# Patient Record
Sex: FEMALE | Race: OTHER | Employment: UNEMPLOYED | URBAN - METROPOLITAN AREA
[De-identification: names, ages, dates, MRNs, and addresses within clinical notes are randomized per-mention and may not be internally consistent; named-entity substitution may affect disease eponyms.]

---

## 2021-02-10 LAB
ABO, EXTERNAL RESULT: NORMAL
HEP B, EXTERNAL RESULT: NEGATIVE
HIV, EXTERNAL RESULT: NEGATIVE
N. GONORRHOEAE, EXTERNAL RESULT: NEGATIVE
RH FACTOR, EXTERNAL RESULT: POSITIVE
RPR, EXTERNAL RESULT: NON REACTIVE
RUBELLA TITER, EXTERNAL RESULT: NORMAL

## 2021-04-28 ENCOUNTER — HOSPITAL ENCOUNTER (OUTPATIENT)
Age: 36
Discharge: HOME HEALTH | End: 2021-04-28
Attending: OBSTETRICS & GYNECOLOGY | Admitting: OBSTETRICS & GYNECOLOGY

## 2021-04-28 ENCOUNTER — HOSPITAL ENCOUNTER (EMERGENCY)
Age: 36
Discharge: HOME | End: 2021-04-28

## 2021-04-28 VITALS
BODY MASS INDEX: 28.68 KG/M2 | SYSTOLIC BLOOD PRESSURE: 110 MMHG | RESPIRATION RATE: 16 BRPM | HEART RATE: 92 BPM | DIASTOLIC BLOOD PRESSURE: 69 MMHG | TEMPERATURE: 98.2 F | HEIGHT: 64 IN | OXYGEN SATURATION: 98 % | WEIGHT: 168 LBS

## 2021-04-28 VITALS — HEIGHT: 64 IN | WEIGHT: 138 LBS | TEMPERATURE: 98.4 F | BODY MASS INDEX: 23.56 KG/M2

## 2021-04-28 DIAGNOSIS — R10.9 ABDOMINAL PAIN DURING PREGNANCY IN SECOND TRIMESTER: Primary | ICD-10-CM

## 2021-04-28 DIAGNOSIS — O26.892 ABDOMINAL PAIN DURING PREGNANCY IN SECOND TRIMESTER: Primary | ICD-10-CM

## 2021-04-28 PROBLEM — Z33.1 PREGNANCY AS INCIDENTAL FINDING: Status: ACTIVE | Noted: 2021-04-28

## 2021-04-28 LAB
ALBUMIN SERPL-MCNC: 3.7 GM/DL (ref 3.4–5)
ALP BLD-CCNC: 62 IU/L (ref 40–129)
ALT SERPL-CCNC: 9 U/L (ref 10–40)
ANION GAP SERPL CALCULATED.3IONS-SCNC: 6 MMOL/L (ref 4–16)
AST SERPL-CCNC: 13 IU/L (ref 15–37)
BACTERIA: NEGATIVE /HPF
BASOPHILS ABSOLUTE: 0 K/CU MM
BASOPHILS RELATIVE PERCENT: 0.4 % (ref 0–1)
BILIRUB SERPL-MCNC: 0.2 MG/DL (ref 0–1)
BILIRUBIN URINE: NEGATIVE MG/DL
BLOOD, URINE: NEGATIVE
BUN BLDV-MCNC: 8 MG/DL (ref 6–23)
CALCIUM SERPL-MCNC: 9.7 MG/DL (ref 8.3–10.6)
CHLORIDE BLD-SCNC: 107 MMOL/L (ref 99–110)
CLARITY: CLEAR
CO2: 23 MMOL/L (ref 21–32)
COLOR: ABNORMAL
CREAT SERPL-MCNC: 0.7 MG/DL (ref 0.6–1.1)
DIFFERENTIAL TYPE: ABNORMAL
EOSINOPHILS ABSOLUTE: 0 K/CU MM
EOSINOPHILS RELATIVE PERCENT: 0.4 % (ref 0–3)
GFR AFRICAN AMERICAN: >60 ML/MIN/1.73M2
GFR NON-AFRICAN AMERICAN: >60 ML/MIN/1.73M2
GLUCOSE BLD-MCNC: 84 MG/DL (ref 70–99)
GLUCOSE, URINE: NEGATIVE MG/DL
GONADOTROPIN, CHORIONIC (HCG) QUANT: NORMAL UIU/ML
HCT VFR BLD CALC: 35.6 % (ref 37–47)
HEMOGLOBIN: 11.6 GM/DL (ref 12.5–16)
IMMATURE NEUTROPHIL %: 0.4 % (ref 0–0.43)
KETONES, URINE: NEGATIVE MG/DL
LEUKOCYTE ESTERASE, URINE: ABNORMAL
LIPASE: 30 IU/L (ref 13–60)
LYMPHOCYTES ABSOLUTE: 1.2 K/CU MM
LYMPHOCYTES RELATIVE PERCENT: 12.3 % (ref 24–44)
MCH RBC QN AUTO: 33 PG (ref 27–31)
MCHC RBC AUTO-ENTMCNC: 32.6 % (ref 32–36)
MCV RBC AUTO: 101.1 FL (ref 78–100)
MONOCYTES ABSOLUTE: 0.6 K/CU MM
MONOCYTES RELATIVE PERCENT: 6.5 % (ref 0–4)
MUCUS: ABNORMAL HPF
NITRITE URINE, QUANTITATIVE: NEGATIVE
NUCLEATED RBC %: 0 %
PDW BLD-RTO: 13 % (ref 11.7–14.9)
PH, URINE: 6 (ref 5–8)
PLATELET # BLD: 256 K/CU MM (ref 140–440)
PMV BLD AUTO: 10.3 FL (ref 7.5–11.1)
POTASSIUM SERPL-SCNC: 4.1 MMOL/L (ref 3.5–5.1)
PROTEIN UA: NEGATIVE MG/DL
RBC # BLD: 3.52 M/CU MM (ref 4.2–5.4)
RBC URINE: <1 /HPF (ref 0–6)
SEGMENTED NEUTROPHILS ABSOLUTE COUNT: 7.8 K/CU MM
SEGMENTED NEUTROPHILS RELATIVE PERCENT: 80 % (ref 36–66)
SODIUM BLD-SCNC: 136 MMOL/L (ref 135–145)
SPECIFIC GRAVITY UA: 1.01 (ref 1–1.03)
SPERM: ABNORMAL /HFP
SQUAMOUS EPITHELIAL: 2 /HPF
TOTAL IMMATURE NEUTOROPHIL: 0.04 K/CU MM
TOTAL NUCLEATED RBC: 0 K/CU MM
TOTAL PROTEIN: 6.8 GM/DL (ref 6.4–8.2)
TRICHOMONAS: ABNORMAL /HPF
UROBILINOGEN, URINE: NEGATIVE MG/DL (ref 0.2–1)
WBC # BLD: 9.7 K/CU MM (ref 4–10.5)
WBC UA: 1 /HPF (ref 0–5)

## 2021-04-28 PROCEDURE — 6370000000 HC RX 637 (ALT 250 FOR IP): Performed by: OBSTETRICS & GYNECOLOGY

## 2021-04-28 PROCEDURE — 80053 COMPREHEN METABOLIC PANEL: CPT

## 2021-04-28 PROCEDURE — 99211 OFF/OP EST MAY X REQ PHY/QHP: CPT

## 2021-04-28 PROCEDURE — 83690 ASSAY OF LIPASE: CPT

## 2021-04-28 PROCEDURE — 84702 CHORIONIC GONADOTROPIN TEST: CPT

## 2021-04-28 PROCEDURE — 99284 EMERGENCY DEPT VISIT MOD MDM: CPT

## 2021-04-28 PROCEDURE — 85025 COMPLETE CBC W/AUTO DIFF WBC: CPT

## 2021-04-28 PROCEDURE — 81001 URINALYSIS AUTO W/SCOPE: CPT

## 2021-04-28 RX ORDER — ACETAMINOPHEN 325 MG/1
650 TABLET ORAL ONCE
Status: COMPLETED | OUTPATIENT
Start: 2021-04-28 | End: 2021-04-28

## 2021-04-28 RX ADMIN — ACETAMINOPHEN 650 MG: 325 TABLET ORAL at 14:25

## 2021-04-28 ASSESSMENT — PAIN DESCRIPTION - FREQUENCY: FREQUENCY: CONTINUOUS

## 2021-04-28 ASSESSMENT — PAIN DESCRIPTION - DESCRIPTORS: DESCRIPTORS: CRAMPING

## 2021-04-28 NOTE — FLOWSHEET NOTE
Presents to L/D up from ER for evaluations of lower abdominal discomfort, shown to LT 02, doppler heart tones 156, TOCO On advised pt would call inter[eter line for OB history.

## 2021-04-28 NOTE — ED PROVIDER NOTES
Patient Identification  Art Patricio is a 29 y.o. female    Chief Complaint  No chief complaint on file. HPI  (History provided by patient through [employer]  service)  This is a 29 y.o. female who was brought in by self for chief complaint of lower abdominal pain. She states she is currently 24 weeks pregnant which I was not aware of until speaking to her through [employer] service. She states this is her second pregnancy, no complications with first pregnancy. Last night began developing cramping intermittent lower abdominal pain that is 8 out of 10. She denies any vaginal bleeding. No dysuria. No fevers. No vomiting. No changes in bowel movements. Has seen OB/GYN at [facility], had ultrasound performed last week which showed that she is having a boy, no concerns. REVIEW OF SYSTEMS    10 systems reviewed and negative except as noted above. See HPI and nursing notes for additional information     I have reviewed the following nursing documentation:  Allergies: Not on File    Past medical history:  has no past medical history on file. Past surgical history:  has no past surgical history on file. Home medications:   Prior to Admission medications    Not on File       Social history:      Family history:  No family history on file. Exam  /69   Pulse 92   Temp 98.2 °F (36.8 °C) (Oral)   Resp 16   Ht 5' 4\" (1.626 m)   Wt 168 lb (76.2 kg)   SpO2 98%   BMI 28.84 kg/m²   Nursing note and vitals reviewed. Constitutional: Well developed, well nourished. No acute distress. HENT:      Head: Normocephalic and atraumatic. Ears: External ears normal.      Nose: Nose normal.     Mouth: Membrane mucosa moist and pink. No posterior oropharynx erythema or tonsillar edema  Eyes: Anicteric sclera. No discharge, PERRL  Neck: Supple. Trachea midline. Cardiovascular: RRR, no murmurs, rubs, or gallops, radial pulses 2+ bilaterally.   Pulmonary/Chest: Effort normal. No respiratory distress. CTAB. No stridor. No wheezes. No rales. Abdominal: Soft. Fundus is approximately 3 cm above the umbilicus, no tenderness to palpation noted, firm. No distension. No guarding, rebound tenderness, or evidence of ascites. : No CVA tenderness. Musculoskeletal: Moves all extremities. No gross deformity. Neurological: Alert and oriented to person, place, and time. Normal muscle tone. Skin: Warm and dry. No rash. Psychiatric: Normal mood and affect.  Behavior is normal.      Radiographs (if obtained):  [] The following radiograph was interpreted by myself in the absence of a radiologist:   [x] Radiologist's Report Reviewed:  No orders to display          Labs  Results for orders placed or performed during the hospital encounter of 04/28/21   CBC Auto Differential   Result Value Ref Range    WBC 9.7 4.0 - 10.5 K/CU MM    RBC 3.52 (L) 4.2 - 5.4 M/CU MM    Hemoglobin 11.6 (L) 12.5 - 16.0 GM/DL    Hematocrit 35.6 (L) 37 - 47 %    .1 (H) 78 - 100 FL    MCH 33.0 (H) 27 - 31 PG    MCHC 32.6 32.0 - 36.0 %    RDW 13.0 11.7 - 14.9 %    Platelets 821 105 - 896 K/CU MM    MPV 10.3 7.5 - 11.1 FL    Differential Type AUTOMATED DIFFERENTIAL     Segs Relative 80.0 (H) 36 - 66 %    Lymphocytes % 12.3 (L) 24 - 44 %    Monocytes % 6.5 (H) 0 - 4 %    Eosinophils % 0.4 0 - 3 %    Basophils % 0.4 0 - 1 %    Segs Absolute 7.8 K/CU MM    Lymphocytes Absolute 1.2 K/CU MM    Monocytes Absolute 0.6 K/CU MM    Eosinophils Absolute 0.0 K/CU MM    Basophils Absolute 0.0 K/CU MM    Nucleated RBC % 0.0 %    Total Nucleated RBC 0.0 K/CU MM    Total Immature Neutrophil 0.04 K/CU MM    Immature Neutrophil % 0.4 0 - 0.43 %   CMP   Result Value Ref Range    Sodium 136 135 - 145 MMOL/L    Potassium 4.1 3.5 - 5.1 MMOL/L    Chloride 107 99 - 110 mMol/L    CO2 23 21 - 32 MMOL/L    BUN 8 6 - 23 MG/DL    CREATININE 0.7 0.6 - 1.1 MG/DL    Glucose 84 70 - 99 MG/DL    Calcium 9.7 8.3 - 10.6 MG/DL    Albumin 3.7 3.4 - 5.0 GM/DL    Total Protein 6.8 6.4 - 8.2 GM/DL    Total Bilirubin 0.2 0.0 - 1.0 MG/DL    ALT 9 (L) 10 - 40 U/L    AST 13 (L) 15 - 37 IU/L    Alkaline Phosphatase 62 40 - 129 IU/L    GFR Non-African American >60 >60 mL/min/1.73m2    GFR African American >60 >60 mL/min/1.73m2    Anion Gap 6 4 - 16   Lipase   Result Value Ref Range    Lipase 30 13 - 60 IU/L   Urinalysis   Result Value Ref Range    Color, UA STRAW (A) YELLOW    Clarity, UA CLEAR CLEAR    Glucose, Urine NEGATIVE NEGATIVE MG/DL    Bilirubin Urine NEGATIVE NEGATIVE MG/DL    Ketones, Urine NEGATIVE NEGATIVE MG/DL    Specific Gravity, UA 1.011 1.001 - 1.035    Blood, Urine NEGATIVE NEGATIVE    pH, Urine 6.0 5.0 - 8.0    Protein, UA NEGATIVE NEGATIVE MG/DL    Urobilinogen, Urine NEGATIVE 0.2 - 1.0 MG/DL    Nitrite Urine, Quantitative NEGATIVE NEGATIVE    Leukocyte Esterase, Urine TRACE (A) NEGATIVE    RBC, UA <1 0 - 6 /HPF    WBC, UA 1 0 - 5 /HPF    Bacteria, UA NEGATIVE NEGATIVE /HPF    Squam Epithel, UA 2 /HPF    Mucus, UA RARE (A) NEGATIVE HPF    Sperm, UA RARE /HFP    Trichomonas, UA NONE SEEN NONE SEEN /HPF   HCG Serum, Quantitative   Result Value Ref Range    hCG Quant 89576.0 UIU/ML         MDM  Patient presents for abdominal pain. I was unaware she was currently pregnant until speaking with her through [employer]  service after she had been in department for several hours. Spoke with triage staff who stated that she denied being pregnant during triage. She has a palpable fundus. Her laboratory testing shows significantly elevated hCG quant. It sounds like she had anatomy scan performed last week which would fit with her gestational age. Other labs show mild anemia. Given that she over 20 weeks she is being discharged to go immediately to labor and delivery, will be transported by ED staff. She is comfortable with this plan. Plan is to discharge. Return to ED for any new or worsening symptoms.     I have independently evaluated this patient. Final Impression  1. Abdominal pain during pregnancy in second trimester        Blood pressure 110/69, pulse 92, temperature 98.2 °F (36.8 °C), temperature source Oral, resp. rate 16, height 5' 4\" (1.626 m), weight 168 lb (76.2 kg), SpO2 98 %. Disposition:  Discharge to L&D in stable condition. Patient was given scripts for the following medications. I counseled patient how to take these medications. New Prescriptions    No medications on file       This chart was generated using the [address] [place] [address] dictation system. I created this record but it may contain dictation errors given the limitations of this technology.        Yovany Santiago PA-C  04/28/21 0083

## 2021-04-28 NOTE — FLOWSHEET NOTE
Tele Language Line [id number] Ob history taken POC discussed . Questions answered,  Pt states baby has been active and moving, denies any vaginal leaking or bleeding denies any tender spots to touch on abdomen.

## 2021-06-10 LAB — GBS, EXTERNAL RESULT: NEGATIVE

## 2021-08-24 ENCOUNTER — ANESTHESIA (OUTPATIENT)
Dept: LABOR AND DELIVERY | Age: 36
DRG: 560 | End: 2021-08-24
Payer: MEDICAID

## 2021-08-24 ENCOUNTER — HOSPITAL ENCOUNTER (INPATIENT)
Age: 36
LOS: 2 days | Discharge: HOME | DRG: 560 | End: 2021-08-26
Attending: OBSTETRICS & GYNECOLOGY | Admitting: OBSTETRICS & GYNECOLOGY
Payer: MEDICAID

## 2021-08-24 ENCOUNTER — ANESTHESIA EVENT (OUTPATIENT)
Dept: LABOR AND DELIVERY | Age: 36
DRG: 560 | End: 2021-08-24
Payer: MEDICAID

## 2021-08-24 DIAGNOSIS — G89.18 POST-OP PAIN: Primary | ICD-10-CM

## 2021-08-24 LAB
ABO/RH: NORMAL
ALBUMIN SERPL-MCNC: 3.5 GM/DL (ref 3.4–5)
ALP BLD-CCNC: 198 IU/L (ref 40–128)
ALT SERPL-CCNC: 7 U/L (ref 10–40)
AMPHETAMINES: NEGATIVE
ANION GAP SERPL CALCULATED.3IONS-SCNC: 15 MMOL/L (ref 4–16)
ANTIBODY SCREEN: NEGATIVE
AST SERPL-CCNC: 14 IU/L (ref 15–37)
BACTERIA: NEGATIVE /HPF
BARBITURATE SCREEN URINE: NEGATIVE
BENZODIAZEPINE SCREEN, URINE: NEGATIVE
BILIRUB SERPL-MCNC: 0.2 MG/DL (ref 0–1)
BILIRUBIN URINE: NEGATIVE MG/DL
BLOOD, URINE: ABNORMAL
BUN BLDV-MCNC: 12 MG/DL (ref 6–23)
CALCIUM SERPL-MCNC: 9.2 MG/DL (ref 8.3–10.6)
CANNABINOID SCREEN URINE: NEGATIVE
CHLORIDE BLD-SCNC: 109 MMOL/L (ref 99–110)
CLARITY: ABNORMAL
CO2: 18 MMOL/L (ref 21–32)
COCAINE METABOLITE: NEGATIVE
COLOR: YELLOW
CREAT SERPL-MCNC: 0.7 MG/DL (ref 0.6–1.1)
GFR AFRICAN AMERICAN: >60 ML/MIN/1.73M2
GFR NON-AFRICAN AMERICAN: >60 ML/MIN/1.73M2
GLUCOSE BLD-MCNC: 125 MG/DL (ref 70–99)
GLUCOSE, URINE: NEGATIVE MG/DL
HCT VFR BLD CALC: 31.9 % (ref 37–47)
HEMOGLOBIN: 10.1 GM/DL (ref 12.5–16)
KETONES, URINE: NEGATIVE MG/DL
LEUKOCYTE ESTERASE, URINE: NEGATIVE
MCH RBC QN AUTO: 30.9 PG (ref 27–31)
MCHC RBC AUTO-ENTMCNC: 31.7 % (ref 32–36)
MCV RBC AUTO: 97.6 FL (ref 78–100)
NITRITE URINE, QUANTITATIVE: NEGATIVE
OPIATES, URINE: NEGATIVE
OXYCODONE: NEGATIVE
PDW BLD-RTO: 14.1 % (ref 11.7–14.9)
PH, URINE: 5 (ref 5–8)
PHENCYCLIDINE, URINE: NEGATIVE
PLATELET # BLD: 178 K/CU MM (ref 140–440)
PMV BLD AUTO: 12.3 FL (ref 7.5–11.1)
POTASSIUM SERPL-SCNC: 4.4 MMOL/L (ref 3.5–5.1)
PROTEIN UA: NEGATIVE MG/DL
RBC # BLD: 3.27 M/CU MM (ref 4.2–5.4)
RBC URINE: 308 /HPF (ref 0–6)
SARS-COV-2, NAAT: NOT DETECTED
SODIUM BLD-SCNC: 142 MMOL/L (ref 135–145)
SOURCE: NORMAL
SPECIFIC GRAVITY UA: 1.01 (ref 1–1.03)
SQUAMOUS EPITHELIAL: 1 /HPF
TOTAL PROTEIN: 5.8 GM/DL (ref 6.4–8.2)
TRANSITIONAL EPITHELIAL: <1 /HPF
TRICHOMONAS: ABNORMAL /HPF
UROBILINOGEN, URINE: NEGATIVE MG/DL (ref 0.2–1)
WBC # BLD: 6 K/CU MM (ref 4–10.5)
WBC UA: 5 /HPF (ref 0–5)

## 2021-08-24 PROCEDURE — 87635 SARS-COV-2 COVID-19 AMP PRB: CPT

## 2021-08-24 PROCEDURE — 85027 COMPLETE CBC AUTOMATED: CPT

## 2021-08-24 PROCEDURE — 51702 INSERT TEMP BLADDER CATH: CPT

## 2021-08-24 PROCEDURE — 6360000002 HC RX W HCPCS: Performed by: NURSE ANESTHETIST, CERTIFIED REGISTERED

## 2021-08-24 PROCEDURE — 7200000001 HC VAGINAL DELIVERY

## 2021-08-24 PROCEDURE — 86901 BLOOD TYPING SEROLOGIC RH(D): CPT

## 2021-08-24 PROCEDURE — 80307 DRUG TEST PRSMV CHEM ANLYZR: CPT

## 2021-08-24 PROCEDURE — 6360000002 HC RX W HCPCS: Performed by: OBSTETRICS & GYNECOLOGY

## 2021-08-24 PROCEDURE — 0HQ9XZZ REPAIR PERINEUM SKIN, EXTERNAL APPROACH: ICD-10-PCS | Performed by: OBSTETRICS & GYNECOLOGY

## 2021-08-24 PROCEDURE — 3700000025 EPIDURAL BLOCK: Performed by: NURSE ANESTHETIST, CERTIFIED REGISTERED

## 2021-08-24 PROCEDURE — 81001 URINALYSIS AUTO W/SCOPE: CPT

## 2021-08-24 PROCEDURE — 6370000000 HC RX 637 (ALT 250 FOR IP): Performed by: OBSTETRICS & GYNECOLOGY

## 2021-08-24 PROCEDURE — 86850 RBC ANTIBODY SCREEN: CPT

## 2021-08-24 PROCEDURE — 2580000003 HC RX 258: Performed by: OBSTETRICS & GYNECOLOGY

## 2021-08-24 PROCEDURE — 86900 BLOOD TYPING SEROLOGIC ABO: CPT

## 2021-08-24 PROCEDURE — 1220000000 HC SEMI PRIVATE OB R&B

## 2021-08-24 PROCEDURE — 80053 COMPREHEN METABOLIC PANEL: CPT

## 2021-08-24 RX ORDER — SODIUM CHLORIDE, SODIUM LACTATE, POTASSIUM CHLORIDE, CALCIUM CHLORIDE 600; 310; 30; 20 MG/100ML; MG/100ML; MG/100ML; MG/100ML
INJECTION, SOLUTION INTRAVENOUS CONTINUOUS
Status: DISCONTINUED | OUTPATIENT
Start: 2021-08-24 | End: 2021-08-26 | Stop reason: HOSPADM

## 2021-08-24 RX ORDER — LANOLIN 100 %
OINTMENT (GRAM) TOPICAL PRN
Status: DISCONTINUED | OUTPATIENT
Start: 2021-08-24 | End: 2021-08-26 | Stop reason: HOSPADM

## 2021-08-24 RX ORDER — SODIUM CHLORIDE 0.9 % (FLUSH) 0.9 %
5-40 SYRINGE (ML) INJECTION PRN
Status: DISCONTINUED | OUTPATIENT
Start: 2021-08-24 | End: 2021-08-26 | Stop reason: HOSPADM

## 2021-08-24 RX ORDER — ROPIVACAINE HYDROCHLORIDE 2 MG/ML
INJECTION, SOLUTION EPIDURAL; INFILTRATION; PERINEURAL PRN
Status: DISCONTINUED | OUTPATIENT
Start: 2021-08-24 | End: 2021-08-24 | Stop reason: SDUPTHER

## 2021-08-24 RX ORDER — IBUPROFEN 800 MG/1
800 TABLET ORAL EVERY 8 HOURS
Status: DISCONTINUED | OUTPATIENT
Start: 2021-08-25 | End: 2021-08-26 | Stop reason: HOSPADM

## 2021-08-24 RX ORDER — FENTANYL CITRATE 50 UG/ML
100 INJECTION, SOLUTION INTRAMUSCULAR; INTRAVENOUS
Status: DISCONTINUED | OUTPATIENT
Start: 2021-08-24 | End: 2021-08-24 | Stop reason: HOSPADM

## 2021-08-24 RX ORDER — HYDROCODONE BITARTRATE AND ACETAMINOPHEN 5; 325 MG/1; MG/1
1 TABLET ORAL EVERY 4 HOURS PRN
Status: DISCONTINUED | OUTPATIENT
Start: 2021-08-24 | End: 2021-08-26 | Stop reason: HOSPADM

## 2021-08-24 RX ORDER — NALOXONE HYDROCHLORIDE 0.4 MG/ML
0.4 INJECTION, SOLUTION INTRAMUSCULAR; INTRAVENOUS; SUBCUTANEOUS PRN
Status: DISCONTINUED | OUTPATIENT
Start: 2021-08-24 | End: 2021-08-26 | Stop reason: HOSPADM

## 2021-08-24 RX ORDER — ROPIVACAINE HYDROCHLORIDE 2 MG/ML
INJECTION, SOLUTION EPIDURAL; INFILTRATION; PERINEURAL CONTINUOUS PRN
Status: DISCONTINUED | OUTPATIENT
Start: 2021-08-24 | End: 2021-08-24 | Stop reason: SDUPTHER

## 2021-08-24 RX ORDER — LIDOCAINE HYDROCHLORIDE 10 MG/ML
30 INJECTION, SOLUTION EPIDURAL; INFILTRATION; INTRACAUDAL; PERINEURAL PRN
Status: DISCONTINUED | OUTPATIENT
Start: 2021-08-24 | End: 2021-08-26 | Stop reason: HOSPADM

## 2021-08-24 RX ORDER — ROPIVACAINE HYDROCHLORIDE 2 MG/ML
12 INJECTION, SOLUTION EPIDURAL; INFILTRATION; PERINEURAL CONTINUOUS
Status: DISCONTINUED | OUTPATIENT
Start: 2021-08-24 | End: 2021-08-26 | Stop reason: HOSPADM

## 2021-08-24 RX ORDER — DOCUSATE SODIUM 100 MG/1
100 CAPSULE, LIQUID FILLED ORAL 2 TIMES DAILY
Status: DISCONTINUED | OUTPATIENT
Start: 2021-08-24 | End: 2021-08-26 | Stop reason: HOSPADM

## 2021-08-24 RX ORDER — HYDROCODONE BITARTRATE AND ACETAMINOPHEN 5; 325 MG/1; MG/1
2 TABLET ORAL EVERY 4 HOURS PRN
Status: DISCONTINUED | OUTPATIENT
Start: 2021-08-24 | End: 2021-08-26 | Stop reason: HOSPADM

## 2021-08-24 RX ORDER — ACETAMINOPHEN 325 MG/1
650 TABLET ORAL EVERY 4 HOURS PRN
Status: DISCONTINUED | OUTPATIENT
Start: 2021-08-24 | End: 2021-08-26 | Stop reason: HOSPADM

## 2021-08-24 RX ORDER — SODIUM CHLORIDE 9 MG/ML
25 INJECTION, SOLUTION INTRAVENOUS PRN
Status: DISCONTINUED | OUTPATIENT
Start: 2021-08-24 | End: 2021-08-26 | Stop reason: HOSPADM

## 2021-08-24 RX ORDER — ONDANSETRON 2 MG/ML
4 INJECTION INTRAMUSCULAR; INTRAVENOUS EVERY 6 HOURS PRN
Status: DISCONTINUED | OUTPATIENT
Start: 2021-08-24 | End: 2021-08-24 | Stop reason: HOSPADM

## 2021-08-24 RX ORDER — ONDANSETRON 2 MG/ML
4 INJECTION INTRAMUSCULAR; INTRAVENOUS EVERY 6 HOURS PRN
Status: DISCONTINUED | OUTPATIENT
Start: 2021-08-24 | End: 2021-08-26 | Stop reason: HOSPADM

## 2021-08-24 RX ORDER — SODIUM CHLORIDE 0.9 % (FLUSH) 0.9 %
5-40 SYRINGE (ML) INJECTION EVERY 12 HOURS SCHEDULED
Status: DISCONTINUED | OUTPATIENT
Start: 2021-08-24 | End: 2021-08-26 | Stop reason: HOSPADM

## 2021-08-24 RX ADMIN — HYDROCODONE BITARTRATE AND ACETAMINOPHEN 1 TABLET: 5; 325 TABLET ORAL at 17:49

## 2021-08-24 RX ADMIN — DOCUSATE SODIUM 100 MG: 100 CAPSULE ORAL at 20:37

## 2021-08-24 RX ADMIN — SODIUM CHLORIDE, POTASSIUM CHLORIDE, SODIUM LACTATE AND CALCIUM CHLORIDE: 600; 310; 30; 20 INJECTION, SOLUTION INTRAVENOUS at 01:29

## 2021-08-24 RX ADMIN — ROPIVACAINE HYDROCHLORIDE 10 ML: 2 INJECTION, SOLUTION EPIDURAL; INFILTRATION at 04:01

## 2021-08-24 RX ADMIN — SODIUM CHLORIDE, POTASSIUM CHLORIDE, SODIUM LACTATE AND CALCIUM CHLORIDE: 600; 310; 30; 20 INJECTION, SOLUTION INTRAVENOUS at 04:22

## 2021-08-24 RX ADMIN — Medication 166.7 ML: at 05:03

## 2021-08-24 RX ADMIN — Medication 1 MILLI-UNITS/MIN: at 02:02

## 2021-08-24 RX ADMIN — HYDROCODONE BITARTRATE AND ACETAMINOPHEN 1 TABLET: 5; 325 TABLET ORAL at 10:32

## 2021-08-24 RX ADMIN — ROPIVACAINE HYDROCHLORIDE 12 ML/HR: 2 INJECTION, SOLUTION EPIDURAL; INFILTRATION; PERINEURAL at 04:04

## 2021-08-24 RX ADMIN — DOCUSATE SODIUM 100 MG: 100 CAPSULE ORAL at 10:31

## 2021-08-24 RX ADMIN — DOCUSATE SODIUM 100 MG: 100 CAPSULE ORAL at 10:32

## 2021-08-24 RX ADMIN — SODIUM CHLORIDE, POTASSIUM CHLORIDE, SODIUM LACTATE AND CALCIUM CHLORIDE: 600; 310; 30; 20 INJECTION, SOLUTION INTRAVENOUS at 05:03

## 2021-08-24 RX ADMIN — ACETAMINOPHEN 650 MG: 325 TABLET ORAL at 20:37

## 2021-08-24 RX ADMIN — SODIUM CHLORIDE, POTASSIUM CHLORIDE, SODIUM LACTATE AND CALCIUM CHLORIDE: 600; 310; 30; 20 INJECTION, SOLUTION INTRAVENOUS at 02:04

## 2021-08-24 ASSESSMENT — PAIN DESCRIPTION - ORIENTATION
ORIENTATION: LOWER
ORIENTATION: MID

## 2021-08-24 ASSESSMENT — PAIN DESCRIPTION - DESCRIPTORS
DESCRIPTORS: CRAMPING
DESCRIPTORS: DISCOMFORT;CRAMPING
DESCRIPTORS: CRAMPING

## 2021-08-24 ASSESSMENT — PAIN SCALES - GENERAL
PAINLEVEL_OUTOF10: 0
PAINLEVEL_OUTOF10: 4
PAINLEVEL_OUTOF10: 4
PAINLEVEL_OUTOF10: 0
PAINLEVEL_OUTOF10: 3
PAINLEVEL_OUTOF10: 3
PAINLEVEL_OUTOF10: 0
PAINLEVEL_OUTOF10: 7
PAINLEVEL_OUTOF10: 0
PAINLEVEL_OUTOF10: 4

## 2021-08-24 ASSESSMENT — PAIN - FUNCTIONAL ASSESSMENT
PAIN_FUNCTIONAL_ASSESSMENT: ACTIVITIES ARE NOT PREVENTED

## 2021-08-24 ASSESSMENT — PAIN DESCRIPTION - PAIN TYPE
TYPE: ACUTE PAIN

## 2021-08-24 ASSESSMENT — PAIN DESCRIPTION - PROGRESSION
CLINICAL_PROGRESSION: RESOLVED
CLINICAL_PROGRESSION: RESOLVED
CLINICAL_PROGRESSION: GRADUALLY WORSENING
CLINICAL_PROGRESSION: RESOLVED
CLINICAL_PROGRESSION: GRADUALLY WORSENING
CLINICAL_PROGRESSION: GRADUALLY WORSENING
CLINICAL_PROGRESSION: GRADUALLY IMPROVING
CLINICAL_PROGRESSION: GRADUALLY WORSENING
CLINICAL_PROGRESSION: GRADUALLY IMPROVING
CLINICAL_PROGRESSION: GRADUALLY WORSENING
CLINICAL_PROGRESSION: GRADUALLY WORSENING

## 2021-08-24 ASSESSMENT — PAIN DESCRIPTION - ONSET
ONSET: ON-GOING
ONSET: GRADUAL

## 2021-08-24 ASSESSMENT — PAIN DESCRIPTION - FREQUENCY
FREQUENCY: INTERMITTENT

## 2021-08-24 ASSESSMENT — PAIN DESCRIPTION - LOCATION
LOCATION: ABDOMEN

## 2021-08-24 NOTE — FLOWSHEET NOTE
Patient presents to [facility] by alin with c/o LOF. Oriented to LT-02 and call [facility] system. Assisted patient to change into gown. Language Line dialed and awaiting  for [place].

## 2021-08-24 NOTE — FLOWSHEET NOTE
O2 applied by non-re-breather mask, and pitocin infusion stopped at this time. Pt positioned fully on left side with pillow support.

## 2021-08-24 NOTE — FLOWSHEET NOTE
TR to Dr. Darrel Mckeon regarding patient arriving by squad with PROM and large amount of clear fluid. Reported to doc GA, OB history, patient gets routine prenatal care @ [facility], no prenatal history on unit but nurses are calling labs to obtain prenatal lab work currently, FMS, and SVE results. Orders received. RN voiced understanding.

## 2021-08-24 NOTE — FLOWSHEET NOTE
Epidural Note    [id number]     Chuckie CRAWFORD, IN ROOM. Consent obtained  [address]     TEST DOSE   0402     BOLUS DOSE  0408     ON IV PUMP      Pt resting comfortabley with pillow tilt left side. VS stable. Pt denies any needs at this time.       #[id number] used for interpretation of procedure

## 2021-08-24 NOTE — FLOWSHEET NOTE
External monitors unplugged. Patient transferred to -01 via triage bed by nurse and scrub tech. Oriented to room and call light system. Patient assisted to labor bed.  #[id number] remained online from [phone] throughout admission. Hep B vaccine education read then interpreted. Report given to RISA Larose RN.  remains online.

## 2021-08-24 NOTE — PLAN OF CARE
parameters  2021 08 by Jay Ross RN  Outcome: Completed  2021 by Jay Ross RN  Outcome: Ongoing     Problem:  Screening:  Goal: Ability to make informed decisions regarding treatment has improved  Description: Ability to make informed decisions regarding treatment has improved  2021 by Jay Ross RN  Outcome: Completed  2021 by Jay Ross RN  Outcome: Ongoing     Problem: Pain - Acute:  Goal: Pain level will decrease  Description: Pain level will decrease  2021 7893 by Jay Ross RN  Outcome: Completed  2021 by Jay Ross RN  Outcome: Ongoing  Goal: Able to cope with pain  Description: Able to cope with pain  2021 by Jay Ross RN  Outcome: Completed  2021 by Jay Ross RN  Outcome: Ongoing     Problem: Tissue Perfusion - Uteroplacental, Altered:  Description: [TRUNCATED] For intrapartum patients with recurrent variable decelerations of the fetal heart rate, consider transcervical amnioinfusion. For patients in labor, avoid prophylactic use of continuous maternal oxygen supplementation to prevent nonreassu . ..   Goal: Absence of abnormal fetal heart rate pattern  Description: Absence of abnormal fetal heart rate pattern  2021 6933 by Jay Ross RN  Outcome: Completed  2021 by Jay Rsos RN  Outcome: Ongoing     Problem: Urinary Retention:  Goal: Experiences of bladder distention will decrease  Description: Experiences of bladder distention will decrease  2021 by Jay Ross RN  Outcome: Completed  2021 by Jay Ross RN  Outcome: Ongoing  Goal: Urinary elimination within specified parameters  Description: Urinary elimination within specified parameters  2021 by Jay Ross RN  Outcome: Completed  2021 by Jay Ross RN  Outcome: Ongoing     Problem: Pain:  Description: Pain management should include both nonpharmacologic and pharmacologic interventions. Goal: Pain level will decrease  Description: Pain level will decrease  8/24/2021 8986 by Jaja Philip RN  Outcome: Completed  8/24/2021 0139 by Jaja Philip RN  Outcome: Ongoing  Goal: Control of acute pain  Description: Control of acute pain  Outcome: Completed  Goal: Control of chronic pain  Description: Control of chronic pain  Outcome: Completed     Problem: VAGINAL DELIVERY - RECOVERY AND POST PARTUM  Goal: Vital signs are medically acceptable  Outcome: Ongoing  Goal: Patient will remain free of falls  Outcome: Ongoing  Goal: Fundus firm at midline  Outcome: Ongoing  Goal: Moderate rubra without clots, no purulent discharge, no foul smelling lochia  Outcome: Ongoing  Goal: Empties bladder  Outcome: Ongoing  Goal: Verbalizes understanding of normal bowel function resumption  Outcome: Ongoing  Goal: Edema will be absent or minimal  Outcome: Ongoing  Goal: Breasts are soft with nipple integrity intact  Outcome: Ongoing  Goal: Demonstrates appropriate breast feeding techniques  Outcome: Ongoing  Goal: Appropriate behavior observed  Outcome: Ongoing  Goal: Positive Mother-Baby interactions are observed  Outcome: Ongoing  Goal: Perineum intact without discharge or hematoma  Outcome: Ongoing  Goal: Ambulates independently  Outcome: Ongoing     Problem: PAIN  Goal: Patient's pain/discomfort is manageable  Outcome: Ongoing     Problem: KNOWLEDGE DEFICIT  Goal: Patient/S.O. demonstrates understanding of disease process, treatment plan, medications, and discharge instructions.   Outcome: Ongoing

## 2021-08-24 NOTE — FLOWSHEET NOTE
Bud, CRNA notified via telephone of pts VS, tachycardia, O2 sat, and fluctuating BP. Notified of nursing interventions. No orders at this time.

## 2021-08-24 NOTE — H&P
Department of Obstetrics and Gynecology   Obstetrics History and Physical        CHIEF COMPLAINT:   Chief Complaint   Patient presents with    Rupture of Membranes     SROM         HISTORY OF PRESENT ILLNESS:      The patient is a 29 y.o. female at 37w11d. OB History        2    Para   1    Term   1            AB        Living   1       SAB        TAB        Ectopic        Molar        Multiple        Live Births   1            Patient presents with a chief complaint as above and is being admitted for active phase labor    Estimated Due Date: Estimated Date of Delivery: 21    PRENATAL CARE:    Complicated by: none    PAST OB HISTORY  OB History        2    Para   1    Term   1            AB        Living   1       SAB        TAB        Ectopic        Molar        Multiple        Live Births   1                Past Medical History:    History reviewed. No pertinent past medical history. Past Surgical History:    History reviewed. No pertinent surgical history. Allergies:  Patient has no known allergies. Social History:    Social History     Socioeconomic History    Marital status:      Spouse name: Not on file    Number of children: Not on file    Years of education: Not on file    Highest education level: Not on file   Occupational History    Not on file   Tobacco Use    Smoking status: Never Smoker    Smokeless tobacco: Never Used   Substance and Sexual Activity    Alcohol use: Not Currently    Drug use: Never    Sexual activity: Never   Other Topics Concern    Not on file   Social History Narrative    Not on file     Social Determinants of Health     Financial Resource Strain:     Difficulty of Paying Living Expenses:    Food Insecurity:     Worried About Running Out of Food in the Last Year:     [address] in the Last Year:    Transportation Needs:     Lack of Transportation (Medical):      Lack of Transportation (Non-Medical):    Physical Activity:     Days of Exercise per Week:     Minutes of Exercise per Session:    Stress:     Feeling of Stress :    Social Connections:     Frequency of Communication with Friends and Family:     Frequency of Social Gatherings with Friends and Family:     Attends [employer] Services:     Active Member of Clubs or Organizations:     Attends Club or Organization Meetings:     Marital Status:    Intimate Partner Violence:     Fear of Current or Ex-Partner:     Emotionally Abused:     Physically Abused:     Sexually Abused:      Family History:   History reviewed. No pertinent family history. Medications Prior to Admission:  Medications Prior to Admission: Prenatal Vit-Fe Fumarate-FA (PRENATAL 1+1 PO), Take by mouth    REVIEW OF SYSTEMS:    CONSTITUTIONAL:  negative  RESPIRATORY:  negative  CARDIOVASCULAR:  negative  GASTROINTESTINAL:  negative  ALLERGIC/IMMUNOLOGIC:  negative  NEUROLOGICAL:  negative  BEHAVIOR/PSYCH:  negative    PHYSICAL EXAM:  Blood pressure 120/67, pulse 72, temperature 97.7 °F (36.5 °C), resp. rate 15, height 5' 8\" (1.727 m), weight 172 lb (78 kg). General appearance:  awake, alert, cooperative, no apparent distress, and appears stated age  Neurologic:  Awake, alert, oriented to name, place and time.     Lungs:  No increased work of breathing, good air exchange  Abdomen:  Soft, non tender, gravid, consistent with her gestational age,   Fetal heart rate:    Baseline Heart Rate:  120s        Accelerations:  present       Long Term Variability:  moderate       Decelerations:  variable       Pelvis:  Adequate pelvis  Cervix: 4 cm 80 soft -2      Contraction frequency:  0 minutes    Membranes:  Ruptured clear fluid    ASSESSMENT AND PLAN:    Labor: Admit, anticipate normal delivery, routine labor orders  Fetus: Reassuring  GBS:negative  Other: IV hydration and antiemetics, pitocin

## 2021-08-24 NOTE — ANESTHESIA PRE PROCEDURE
Department of Anesthesiology  Preprocedure Note       Name:  Susan Ruano   Age:  29 y.o.  :  1985                                          MRN:  [id number]         Date:  2021      Surgeon: * No surgeons listed *    Procedure: * No procedures listed *    Medications prior to admission:   Prior to Admission medications    Medication Sig Start Date End Date Taking? Authorizing Provider   Prenatal Vit-Fe Fumarate-FA (PRENATAL 1+1 PO) Take by mouth   Yes Historical Provider, MD       Current medications:    Current Facility-Administered Medications   Medication Dose Route Frequency Provider Last Rate Last Admin    lactated ringers infusion   Intravenous Continuous Lauren Valentino  mL/hr at 21 0325 Rate Change at 21 0325    oxytocin (PITOCIN) 30 units in 500 mL infusion  87.3 opal-units/min Intravenous Continuous PRN Lauren Valentino MD        And    oxytocin (PITOCIN) 10 unit bolus from the bag  10 Units Intravenous PRN Lauren Valentino MD        lidocaine PF 1 % injection 30 mL  30 mL Other PRN Lauren Valentino MD        fentaNYL (SUBLIMAZE) injection 100 mcg  100 mcg Intravenous Q1H PRN Lauren Valentino MD        famotidine (PEPCID) injection 20 mg  20 mg Intravenous BID PRN Lauren Valentino MD        ondansetron [facility]) injection 4 mg  4 mg Intravenous Q6H PRN Lauren Valentino MD        oxytocin (PITOCIN) 30 units in 500 mL infusion  1-20 opal-units/min Intravenous Continuous Lauren Valentino MD 4 mL/hr at 21 0315 4 opal-units/min at 21 0315       Allergies:  No Known Allergies    Problem List:    Patient Active Problem List   Diagnosis Code    Pregnancy as incidental finding Z33.1       Past Medical History:  History reviewed. No pertinent past medical history. Past Surgical History:  History reviewed. No pertinent surgical history.     Social History:    Social History     Tobacco Use    Smoking status: Never Smoker    Smokeless tobacco: Never Used   Substance Use Topics    Alcohol use: Not Currently                                Counseling given: Not Answered      Vital Signs (Current):   Vitals:    08/24/21 0058 08/24/21 0112 08/24/21 0130 08/24/21 0240   BP: 116/75 122/75  120/67   Pulse: 80 80  72   Resp: 18   15   Temp: 36.7 °C (98.1 °F)   36.5 °C (97.7 °F)   Weight:   172 lb (78 kg)    Height:   5' 8\" (1.727 m)                                               BP Readings from Last 3 Encounters:   08/24/21 120/67   04/28/21 110/69       NPO Status:                                                                                 BMI:   Wt Readings from Last 3 Encounters:   08/24/21 172 lb (78 kg)   04/28/21 138 lb (62.6 kg)   04/28/21 168 lb (76.2 kg)     Body mass index is 26.15 kg/m². CBC:   Lab Results   Component Value Date    WBC 6.0 08/24/2021    RBC 3.27 08/24/2021    HGB 10.1 08/24/2021    HCT 31.9 08/24/2021    MCV 97.6 08/24/2021    RDW 14.1 08/24/2021     08/24/2021       CMP:   Lab Results   Component Value Date     04/28/2021    K 4.1 04/28/2021     04/28/2021    CO2 23 04/28/2021    BUN 8 04/28/2021    CREATININE 0.7 04/28/2021    GFRAA >60 04/28/2021    LABGLOM >60 04/28/2021    GLUCOSE 84 04/28/2021    PROT 6.8 04/28/2021    CALCIUM 9.7 04/28/2021    BILITOT 0.2 04/28/2021    ALKPHOS 62 04/28/2021    AST 13 04/28/2021    ALT 9 04/28/2021       POC Tests: No results for input(s): POCGLU, POCNA, POCK, POCCL, POCBUN, POCHEMO, POCHCT in the last 72 hours.     Coags: No results found for: PROTIME, INR, APTT    HCG (If Applicable): No results found for: PREGTESTUR, PREGSERUM, HCG, HCGQUANT     ABGs: No results found for: PHART, PO2ART, GPO2GMA, DNK2FOX, BEART, [id number]     Type & Screen (If Applicable):  No results found for: LABABO, LABRH    Drug/Infectious Status (If Applicable):  No results found for: HIV, HEPCAB    COVID-19 Screening (If Applicable):   Lab Results   Component Value Date    COVID19 NOT DETECTED 08/24/2021           Anesthesia Evaluation  Patient summary reviewed and Nursing notes reviewed no history of anesthetic complications:   Airway: Mallampati: II  TM distance: >3 FB   Neck ROM: full  Mouth opening: > = 3 FB Dental: normal exam         Pulmonary:Negative Pulmonary ROS and normal exam                               Cardiovascular:Negative CV ROS                      Neuro/Psych:   Negative Neuro/Psych ROS              GI/Hepatic/Renal: Neg GI/Hepatic/Renal ROS            Endo/Other: Negative Endo/Other ROS                    Abdominal:             Vascular: negative vascular ROS. Other Findings:             Anesthesia Plan      epidural     ASA 2             Anesthetic plan and risks discussed with patient.                       AVTAR Magana - CRNA   8/24/2021

## 2021-08-24 NOTE — FLOWSHEET NOTE
#[id number] used. RN aids pt to bathroom. Pt educated on safe ambulation, postpartum bleeding, and vahid care. Pt demonstrates and verbalized understanding. RN provided fresh gown, gripper socks, vahid pad, and underwear to pt. Pt voices no further needs. RN discontinued urinary catheter. Pt aided into wheelchair to be transferred to [place] [address].

## 2021-08-24 NOTE — PLAN OF CARE
Problem: VAGINAL DELIVERY - RECOVERY AND POST PARTUM  Goal: Vital signs are medically acceptable  8/24/2021 1828 by Mis Bullock RN  Outcome: Met This Shift  8/24/2021 1827 by Mis Bullock RN  Outcome: Met This Shift  8/24/2021 0822 by Tigre Cohen RN  Outcome: Ongoing  Goal: Patient will remain free of falls  8/24/2021 1828 by Mis Bullock RN  Outcome: Met This Shift  8/24/2021 1827 by Mis Bullock RN  Outcome: Met This Shift  8/24/2021 0822 by Tigre Cohen RN  Outcome: Ongoing  Goal: Fundus firm at midline  8/24/2021 1828 by Mis Bullock RN  Outcome: Met This Shift  8/24/2021 1827 by Mis Bullock RN  Outcome: Met This Shift  8/24/2021 0822 by Tigre Cohen RN  Outcome: Ongoing  Goal: Moderate rubra without clots, no purulent discharge, no foul smelling lochia  8/24/2021 1828 by Mis Bullock RN  Outcome: Met This Shift  8/24/2021 1827 by Mis Bullock RN  Outcome: Met This Shift  8/24/2021 0822 by Tigre Cohen RN  Outcome: Ongoing  Goal: Empties bladder  8/24/2021 1828 by Mis Bullock RN  Outcome: Met This Shift  8/24/2021 1827 by Mis Bullock RN  Outcome: Met This Shift  8/24/2021 0822 by Tigre Cohen RN  Outcome: Ongoing  Goal: Verbalizes understanding of normal bowel function resumption  8/24/2021 1828 by Mis Bullock RN  Outcome: Met This Shift  8/24/2021 1827 by Mis Bullock RN  Outcome: Met This Shift  8/24/2021 0822 by Tigre Cohen RN  Outcome: Ongoing  Goal: Edema will be absent or minimal  8/24/2021 1828 by Mis Bullock RN  Outcome: Met This Shift  8/24/2021 1827 by Mis Bullock RN  Outcome: Met This Shift  8/24/2021 0822 by Tigre Cohen RN  Outcome: Ongoing  Goal: Breasts are soft with nipple integrity intact  8/24/2021 1828 by Mis Bullock RN  Outcome: Met This Shift  8/24/2021 1827 by Msi Bullock RN  Outcome: Met This Shift  8/24/2021 1722 by Tigre Cohen RN  Outcome: Ongoing  Goal: Demonstrates appropriate breast feeding techniques  8/24/2021 1828 by Mickey West RN  Outcome: Met This Shift  8/24/2021 1827 by Mickey West RN  Outcome: Met This Shift  8/24/2021 0822 by Enrique Duenas RN  Outcome: Ongoing  Goal: Appropriate behavior observed  8/24/2021 1828 by Mickey West RN  Outcome: Met This Shift  8/24/2021 1827 by Mickey West RN  Outcome: Ongoing  8/24/2021 0822 by Enrique Duenas RN  Outcome: Ongoing  Goal: Positive Mother-Baby interactions are observed  8/24/2021 1828 by Mickey West RN  Outcome: Met This Shift  8/24/2021 1827 by Mickey West RN  Outcome: Ongoing  8/24/2021 0822 by Enrique Duenas RN  Outcome: Ongoing  Goal: Perineum intact without discharge or hematoma  8/24/2021 1828 by Mickey West RN  Outcome: Met This Shift  8/24/2021 1827 by Mickey West RN  Outcome: Met This Shift  8/24/2021 0822 by Enrique Duenas RN  Outcome: Ongoing  Goal: Ambulates independently  8/24/2021 1828 by Mickey West RN  Outcome: Met This Shift  8/24/2021 1827 by Mickey West RN  Outcome: Met This Shift  8/24/2021 1784 by Enrique Duenas RN  Outcome: Ongoing

## 2021-08-24 NOTE — L&D DELIVERY NOTE
Mother's Information    Labor Events     labor?: No  Rupture type: Spontaneous=SROM  Fluid color: Clear  Fluid odor: None     Mother Delivery Information    Episiotomy: None  Lacerations: 1st  Repair Suture: None  # of Repair Packets: 0  Vaginal Delivery Est. Blood Loss (mL): 100  Surgical or Additional Est. Blood Loss (mL): 0 (View Only): Edit in Flowsheets   Combined Est. Blood Loss (mL): 100        Florence Luke Boy [place] Peabody [[id number]]    Labor Events     labor?: No   steroids?: None  Cervical ripening date/time:     Antibiotics received during labor?: No  Rupture Identifier: Sac 1   Rupture date/time: 21 00:00:00   Rupture type: Spontaneous=SROM  Fluid color: Clear  Fluid odor: None  Augmentation: Oxytocin  Indications for augmentation: Ineffective Contraction Pattern  Labor complications: None          Labor Event Times    Labor onset date/time: 21   Dilation complete date/time:  21   Start pushin2021 0454   Decision time (emergent ):        Anesthesia    Method: Epidural     Assisted Delivery Details    Forceps attempted?: No  Vacuum extractor attempted?: Yes  Indications: Maternal Fatigue, Fetal Heart Rate or Rhythm Abnormality   Vacuum type: Kiwi   First attempt time vacuum applied: 0500 EDT    First attempt time vacuum removed: 0501 EDT       Document Additional Attempt       Document Additional Attempt       Number of pop offs: 0      Number of pulls: 1    Total vacuum application time: 1 minute   Vacuum applied by: DR. Cadence Goss    Failed?: No      Shoulder Dystocia    Shoulder dystocia present?: No  Add Second Maneuver  Add Third Maneuver  Add Fourth Maneuver  Add Fifth Maneuver  Add Sixth Maneuver  Add Seventh Maneuver  Add Eighth Maneuver  Add Ninth Maneuver      Presentation    Presentation: Vertex  _: Occiput  _: Anterior      Information    Head delivery date/time: 2021 05:01:00   Changing the 's delivery date/time could affect patient care.:    Delivery date/time:  21 0501   Delivery type: Vaginal, Vacuum (Extractor)    Details:        Delivery Providers    Delivering clinician: Rhona Gomez MD     Cord    Vessels: 3 Vessels  Complications: None  Delayed cord clamping?: No  Cord blood disposition: Lab  Gases sent?: No  Stem cell collection (by provider): No     Placenta    Date/time: 2021 05:03:00  Removal: Spontaneous  Appearance: Intact  Disposition: Refrigerator     Delivery Resuscitation    Method: Bulb Suction, Stimulation     Apgars    Living status: Living  Apgars   1 Minute:  5 Minute:  10 Minute 15 Minute 20 Minute   Skin Color: 1  1       Heart Rate: 2        Reflex Irritability: 2  2       Muscle Tone: 2  2       Respiratory Effort: 2  2       Total: 9                Apgars Assigned By: Sonido Platt RN     [place] Measurements    Weight: 3010 g Length: 49.5 cm   Head circumference: 33 cm Chest circumference: 30 cm   Abdominal girth: 29 cm       Delivery Information    Episiotomy: None  Perineal lacerations: 1st Repaired: No   Vaginal laceration: No    Cervical laceration: No    Vaginal delivery est. blood loss (mL): 100  Surgical or additional est. blood loss (mL): 0 (View Only): Edit in Flowsheets   Combined est. blood loss (mL): 100  Repair suture: None     Vaginal Delivery Counts    Initial count personnel: MARCOS REYES RN  Initial count verified by: RISA LIVINGSTON RN   4x4:  Needles:  Instruments:  Lap Pads:  Sponges:    Initial counts:         Final counts:            Other Procedures    Procedures: None          Department of Obstetrics and Gynecology  Spontaneous Vaginal Delivery Note         Pre-operative Diagnosis:  Term pregnancy, Spontaneous labor, Single fetus and Uncomplicated pregnancy    Post-operative Diagnosis:  Same + live male   Procedure:  Low vacuum delivery    Surgeon:  Rhona Gomez MD    Information for the patient's :  Korey Kellogg Trish Nick [[id number]]          Anesthesia:  epidural anesthesia    Estimated blood loss:  100    Specimen:  Placenta not sent to pathology     Cord blood sent Yes    Complications:  none    Condition:  infant stable to general nursery    Details of Procedure: The patient is a 29 y.o. female at 37w11d   OB History        2    Para   1    Term   1            AB        Living   1       SAB        TAB        Ectopic        Molar        Multiple        Live Births   1             who was admitted for active phase labor and SROM. She received the following interventions: IV Pitocin augmentation She was known to be GBS negative and did not receive antibiotic prophylaxis. The patient progressed well,did receive an epidural, became complete and started to push. After pushing for 3 times the fetal head was at the perineum, nose and mouth suctioned with bulb suction and the rest of the infant delivered atraumatically, placed on mother abdomen. Cord was clamped and cut and infant handed off to the waiting nurse for evaluation. The delivery of the placenta was spontaneous. The perineum and vagina were explored and a first degree laceration was repaired in standard fashion. Infant's name is Grzegorz Patel.

## 2021-08-24 NOTE — ANESTHESIA PROCEDURE NOTES
Epidural Block    Patient location during procedure: OB  Start time: 8/24/2021 3:52 AM  End time: 8/24/2021 4:11 AM  Reason for block: labor epidural  Staffing  Resident/CRNA: AVTAR Lopez - CRNA  Preanesthetic Checklist  Completed: patient identified, IV checked, site marked, risks and benefits discussed, surgical consent, monitors and equipment checked, pre-op evaluation, timeout performed, anesthesia consent given, oxygen available and patient being monitored  Epidural  Patient position: sitting  Prep: ChloraPrep and site prepped and draped  Patient monitoring: continuous pulse ox and frequent blood pressure checks  Approach: midline  Location: lumbar (1-5)  Injection technique: RADHA saline  Provider prep: sterile gloves and mask  Needle  Needle type: UReservfranny   Needle gauge: 17 G  Needle length: 3.5 in  Needle insertion depth: 6 cm  Catheter type: side hole  Catheter size: 19 G  Catheter at skin depth: 11 cm  Test dose: negative  Assessment  Sensory level: T8  Hemodynamics: stable  Attempts: 1  Additional Notes  Test dose with 3ml lidocaine 1.5% with epi 1:200,000.

## 2021-08-24 NOTE — FLOWSHEET NOTE
TR to Dr. Darrel Mckeon, given report on pts VS, tachycardia and O2 desaturation, fluctuation in BP, minimal urine output, pts dilation, effacement, and station, and FHR on EFM, minimal variability with decelerations. MD aware of nursing interventions such as fluid bolus, O2, position changes, and stopping pitocin. MD states will evaluate strip remotely.

## 2021-08-24 NOTE — FLOWSHEET NOTE
#[id number] on at [facility]. After 15 minutes no Hatian-Creole  was found. Another attempted  at was called at [phone], after 10 minutes, the  of the language line connected to  #[phone].  was used to give directions on pushing technique and aid with delivery.  disconnected at [employer].

## 2021-08-24 NOTE — ANESTHESIA POSTPROCEDURE EVALUATION
Department of Anesthesiology  Postprocedure Note    Patient: Tito Hughes  MRN: [id number]  YOB: 1985  Date of evaluation: 8/24/2021  Time:  9:25 AM     Procedure Summary     Date: 08/24/21 Room / Location:     Anesthesia Start: 7380 Anesthesia Stop: 0501    Procedure: Labor Analgesia Diagnosis:     Scheduled Providers:  Responsible Provider: AVTAR Ashraf CRNA    Anesthesia Type: epidural ASA Status: 2          Anesthesia Type: epidural    Elías Phase I:      Elías Phase II: Elías Score: 10    Last vitals: Reviewed and per EMR flowsheets.        Anesthesia Post Evaluation    Patient location during evaluation: bedside  Patient participation: complete - patient participated  Level of consciousness: awake and alert  Pain score: 1  Airway patency: patent  Nausea & Vomiting: no nausea  Complications: no  Cardiovascular status: blood pressure returned to baseline and hemodynamically stable  Respiratory status: acceptable, room air and spontaneous ventilation  Hydration status: euvolemic

## 2021-08-25 PROCEDURE — 6370000000 HC RX 637 (ALT 250 FOR IP): Performed by: OBSTETRICS & GYNECOLOGY

## 2021-08-25 PROCEDURE — 1220000000 HC SEMI PRIVATE OB R&B

## 2021-08-25 RX ADMIN — ACETAMINOPHEN 650 MG: 325 TABLET ORAL at 04:01

## 2021-08-25 RX ADMIN — IBUPROFEN 800 MG: 800 TABLET, FILM COATED ORAL at 07:52

## 2021-08-25 RX ADMIN — DOCUSATE SODIUM 100 MG: 100 CAPSULE ORAL at 19:43

## 2021-08-25 RX ADMIN — DOCUSATE SODIUM 100 MG: 100 CAPSULE ORAL at 07:51

## 2021-08-25 RX ADMIN — ACETAMINOPHEN 650 MG: 325 TABLET ORAL at 19:42

## 2021-08-25 RX ADMIN — IBUPROFEN 800 MG: 800 TABLET, FILM COATED ORAL at 00:17

## 2021-08-25 RX ADMIN — IBUPROFEN 800 MG: 800 TABLET, FILM COATED ORAL at 18:23

## 2021-08-25 ASSESSMENT — PAIN DESCRIPTION - PROGRESSION
CLINICAL_PROGRESSION: RESOLVED
CLINICAL_PROGRESSION: NOT CHANGED

## 2021-08-25 ASSESSMENT — PAIN SCALES - GENERAL
PAINLEVEL_OUTOF10: 1
PAINLEVEL_OUTOF10: 0
PAINLEVEL_OUTOF10: 2
PAINLEVEL_OUTOF10: 2
PAINLEVEL_OUTOF10: 0
PAINLEVEL_OUTOF10: 2

## 2021-08-25 NOTE — CARE COORDINATION
LSW received a consult to talk with mom due to her not having custody of her first child. LSW spoke with pt using  Gabby Rosales #[id number]. Pt is planning to breast feed. Pt receives [facility] and plans to continue. Pt denies needing any material items for baby. Pt plans to take baby to the [address] LSW spoke with pt regarding her other child. Pt stated her first child lives in [place] with grandmother. Pt stated the grandmother is raising the child. Pt stated she still has custody of the baby. Pt is negative for drugs. No needs are anticipated at this time.     Baby's Name Jam Guerrero

## 2021-08-25 NOTE — PROGRESS NOTES
Subjective:     Postpartum Day 1:   The patient feels well. The patient denies emotional concerns. Pain is well controlled with current medications. The baby iswell. The patient is ambulating well. The patient is tolerating a normal diet. Objective:        Vitals:    08/25/21 0752   BP: 105/72   Pulse: 63   Resp: 18   Temp: 98.4 °F (36.9 °C)   SpO2: 100%       Lab Results   Component Value Date    WBC 6.0 08/24/2021    HGB 10.1 (L) 08/24/2021    HCT 31.9 (L) 08/24/2021    MCV 97.6 08/24/2021     08/24/2021       General:    alert, appears stated age and cooperative       Lochia:  appropriate   Uterine    firm       DVT Evaluation:  No evidence of DVT seen on physical exam.     Assessment:     Postpartum day 1 Doing well post delivery. Plan:     Continue current care.     Monika Ballard MD 8/25/2021 8:09 AM

## 2021-08-25 NOTE — FLOWSHEET NOTE
AM assessment complete. Bilateral breath sounds clear on auscultation. Pt denies being smoker. Abdomen soft, fundus firm with little rubra. Pt speaks some English and is educated with a college degree. Nursing well in side lying position.

## 2021-08-25 NOTE — FLOWSHEET NOTE
[address] [facility]# [id number]. When asked, \"Pt stated \"I want Circ for baby. \" Pt states she has crib, carseat and crib for baby at home. When asked, Pt stated. \"I have custody of my first child that is 15 yrs old in [place]. \" This child is being raised by my last boyfriend's mother. Pain understood pain goals and pain meds available. Pt asked if she had any questions and Pt denied this. Pt smiling during discussion.

## 2021-08-26 VITALS
TEMPERATURE: 98.4 F | HEART RATE: 89 BPM | WEIGHT: 172 LBS | BODY MASS INDEX: 26.07 KG/M2 | HEIGHT: 68 IN | RESPIRATION RATE: 18 BRPM | OXYGEN SATURATION: 100 % | DIASTOLIC BLOOD PRESSURE: 66 MMHG | SYSTOLIC BLOOD PRESSURE: 118 MMHG

## 2021-08-26 PROBLEM — Z33.1 PREGNANCY AS INCIDENTAL FINDING: Status: RESOLVED | Noted: 2021-04-28 | Resolved: 2021-08-26

## 2021-08-26 PROCEDURE — 6370000000 HC RX 637 (ALT 250 FOR IP): Performed by: OBSTETRICS & GYNECOLOGY

## 2021-08-26 RX ORDER — HYDROCODONE BITARTRATE AND ACETAMINOPHEN 5; 325 MG/1; MG/1
1 TABLET ORAL EVERY 6 HOURS PRN
Qty: 8 TABLET | Refills: 0 | Status: SHIPPED | OUTPATIENT
Start: 2021-08-26 | End: 2021-08-31

## 2021-08-26 RX ORDER — IBUPROFEN 800 MG/1
800 TABLET ORAL EVERY 8 HOURS
Qty: 120 TABLET | Refills: 3 | Status: SHIPPED | OUTPATIENT
Start: 2021-08-26

## 2021-08-26 RX ADMIN — IBUPROFEN 800 MG: 800 TABLET, FILM COATED ORAL at 10:45

## 2021-08-26 RX ADMIN — DOCUSATE SODIUM 100 MG: 100 CAPSULE ORAL at 10:45

## 2021-08-26 RX ADMIN — IBUPROFEN 800 MG: 800 TABLET, FILM COATED ORAL at 02:58

## 2021-08-26 ASSESSMENT — PAIN SCALES - GENERAL
PAINLEVEL_OUTOF10: 0
PAINLEVEL_OUTOF10: 1
PAINLEVEL_OUTOF10: 0

## 2021-08-26 NOTE — FLOWSHEET NOTE
Dr. Daniel Kiran at the bedside, getting consent for circumcision for the patient's infant at this time with the help of  Marine Heath ID [id number].

## 2021-08-26 NOTE — DISCHARGE SUMMARY
Obstetrical Discharge Form    Gestational Age:  37w11d    Antepartum complications: none    Date of Delivery:    21    Type of Delivery:   vaginal, spontaneous    Delivered By:                 Kj Stevenson:       Information for the patient's :  Franck Jamil [[id number]]        Anesthesia:    None    Intrapartum complications: None    Postpartum complications: none    Vitals:    21 0331   BP: 105/61   Pulse: 58   Resp: 18   Temp: 97.9 °F (36.6 °C)   SpO2: 97%     Lab Results   Component Value Date    WBC 6.0 2021    HGB 10.1 (L) 2021    HCT 31.9 (L) 2021    MCV 97.6 2021     2021       Condition at discharge: Good/stable      Discharge Date:  21     Plan:   Follow up in 6 weeks.

## 2021-08-27 NOTE — FLOWSHEET NOTE
ID bands checked. Infant's ID band removed and stapled to footprint sheet, the mother verified as correct, signed and witnessed by RN. Hugs tag removed. Discharge instructions given and reviewed. Rx's called into Pharmacy per Dr Reese Haywood. Ambulating well at discharge with pain #0. Father of baby driving mother and baby home. Mother verbalizes understanding to follow-up with Pediatric Provider, [facility] tomorrow/Friday, appt set by RN for 0930. Baby pink, without distress, harnessed into carseat at discharge. Pt speaks and reads english and is college educated. Discharge in [place] and [place].

## 2021-08-27 NOTE — FLOWSHEET NOTE
Language Line Rhonda Valerie #[id number] called and Discharge instructions given and reviewed. Mother understands to keep appt made with [facility] for baby tomorrow @ 9117 8-. Extra formula given and discussed expirations dates. Mother states she luisito safe cri, carseat and bassinet for baby at home. Reviewed Circ care, cord care and removal of Circ gauze tomorrow.

## 2023-07-17 ENCOUNTER — OFFICE VISIT (OUTPATIENT)
Dept: OBGYN | Age: 38
End: 2023-07-17

## 2023-07-17 ENCOUNTER — HOSPITAL ENCOUNTER (OUTPATIENT)
Age: 38
Setting detail: SPECIMEN
Discharge: HOME | End: 2023-07-17

## 2023-07-17 VITALS
SYSTOLIC BLOOD PRESSURE: 113 MMHG | HEIGHT: 68 IN | WEIGHT: 173 LBS | DIASTOLIC BLOOD PRESSURE: 75 MMHG | BODY MASS INDEX: 26.22 KG/M2

## 2023-07-17 DIAGNOSIS — K08.89 TOOTH PAIN: ICD-10-CM

## 2023-07-17 DIAGNOSIS — Z01.419 WOMEN'S ANNUAL ROUTINE GYNECOLOGICAL EXAMINATION: Primary | ICD-10-CM

## 2023-07-17 DIAGNOSIS — L98.8 SKIN LESION OF BREAST: ICD-10-CM

## 2023-07-17 PROCEDURE — 87624 HPV HI-RISK TYP POOLED RSLT: CPT

## 2023-07-17 PROCEDURE — 88142 CYTOPATH C/V THIN LAYER: CPT

## 2023-07-17 PROCEDURE — 87801 DETECT AGNT MULT DNA AMPLI: CPT

## 2023-07-17 PROCEDURE — 99385 PREV VISIT NEW AGE 18-39: CPT

## 2023-07-17 ASSESSMENT — ENCOUNTER SYMPTOMS
ABDOMINAL PAIN: 0
GASTROINTESTINAL NEGATIVE: 1
RESPIRATORY NEGATIVE: 1

## 2023-07-17 ASSESSMENT — PATIENT HEALTH QUESTIONNAIRE - PHQ9
SUM OF ALL RESPONSES TO PHQ QUESTIONS 1-9: 0
1. LITTLE INTEREST OR PLEASURE IN DOING THINGS: 0
SUM OF ALL RESPONSES TO PHQ9 QUESTIONS 1 & 2: 0
SUM OF ALL RESPONSES TO PHQ QUESTIONS 1-9: 0
SUM OF ALL RESPONSES TO PHQ QUESTIONS 1-9: 0
2. FEELING DOWN, DEPRESSED OR HOPELESS: 0
SUM OF ALL RESPONSES TO PHQ QUESTIONS 1-9: 0

## 2023-07-17 NOTE — PROGRESS NOTES
23    Lu Castano  1985    Chief Complaint   Patient presents with    New Patient     Regular menses, lmp 2023. Is sexually active, b.c condoms, last pap unsure. C/o left breast boil pt requesting to have checked. The patient is a 41 y.o. female, Skippy Fling who presents for her annual exam.  She is menstruating normally. She is  sexually active. She is currently taking birth control. Birth control method is Condoms    She reports additional symptoms of skin eruption on breast .  Pt states boil on L breast has been present for years, is nontender and has not changed in size since first noticing. She states she has a nearby lesion that has appeared more recently on L breast that is smaller, but she reports this is also how her other breast boil appeared when she first noticed it. Pap smear history: Her last PAP smear was in unsure. Her results were normal.    Past Medical History:   Diagnosis Date    Boil, breast        No past surgical history on file. No family history on file. Social History     Tobacco Use    Smoking status: Never    Smokeless tobacco: Never   Vaping Use    Vaping Use: Never used   Substance Use Topics    Alcohol use: Not Currently    Drug use: Never       Current Outpatient Medications   Medication Sig Dispense Refill    ibuprofen (ADVIL;MOTRIN) 800 MG tablet Take 1 tablet by mouth every 8 hours 120 tablet 3    Prenatal Vit-Fe Fumarate-FA (PRENATAL 1+1 PO) Take by mouth       No current facility-administered medications for this visit. No Known Allergies        There is no immunization history for the selected administration types on file for this patient. Review of Systems   Constitutional: Negative. Negative for fatigue and fever. Respiratory: Negative. Gastrointestinal: Negative. Negative for abdominal pain. Endocrine: Negative. Genitourinary: Negative. Negative for menstrual problem and vaginal pain.    Musculoskeletal:

## 2023-07-27 LAB
C TRACH RRNA SPEC QL NAA+PROBE: NEGATIVE
N GONORRHOEA RRNA SPEC QL NAA+PROBE: NEGATIVE

## 2023-07-31 LAB
HPV HIGH RISK: DETECTED
HPV, GENOTYPE 16: NOT DETECTED
HPV, GENOTYPE 18: DETECTED

## 2023-08-24 ENCOUNTER — HOSPITAL ENCOUNTER (OUTPATIENT)
Age: 38
Setting detail: SPECIMEN
Discharge: HOME | End: 2023-08-24

## 2023-08-24 ENCOUNTER — PROCEDURE VISIT (OUTPATIENT)
Dept: OBGYN | Age: 38
End: 2023-08-24

## 2023-08-24 VITALS
WEIGHT: 176 LBS | HEIGHT: 68 IN | SYSTOLIC BLOOD PRESSURE: 123 MMHG | DIASTOLIC BLOOD PRESSURE: 76 MMHG | BODY MASS INDEX: 26.67 KG/M2

## 2023-08-24 DIAGNOSIS — R87.613 HGSIL (HIGH GRADE SQUAMOUS INTRAEPITHELIAL LESION) ON PAP SMEAR OF CERVIX: Primary | ICD-10-CM

## 2023-08-24 PROCEDURE — 87624 HPV HI-RISK TYP POOLED RSLT: CPT

## 2023-08-24 SDOH — ECONOMIC STABILITY: FOOD INSECURITY: WITHIN THE PAST 12 MONTHS, THE FOOD YOU BOUGHT JUST DIDN'T LAST AND YOU DIDN'T HAVE MONEY TO GET MORE.: NEVER TRUE

## 2023-08-24 SDOH — ECONOMIC STABILITY: INCOME INSECURITY: HOW HARD IS IT FOR YOU TO PAY FOR THE VERY BASICS LIKE FOOD, HOUSING, MEDICAL CARE, AND HEATING?: NOT HARD AT ALL

## 2023-08-24 SDOH — ECONOMIC STABILITY: FOOD INSECURITY: WITHIN THE PAST 12 MONTHS, YOU WORRIED THAT YOUR FOOD WOULD RUN OUT BEFORE YOU GOT MONEY TO BUY MORE.: NEVER TRUE

## 2023-08-24 SDOH — ECONOMIC STABILITY: HOUSING INSECURITY
IN THE LAST 12 MONTHS, WAS THERE A TIME WHEN YOU DID NOT HAVE A STEADY PLACE TO SLEEP OR SLEPT IN A SHELTER (INCLUDING NOW)?: NO

## 2023-08-24 NOTE — PROGRESS NOTES
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 Adequate colposcopy with the entire transformation zone seen. White epithelium with some mosaicism seen at the 2 o'clock position. Biopsy obtained. ECC performed. 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 The patient will follow-up in 2 weeks for her pathology results and treatment planning.

## 2023-08-31 LAB
HPV HIGH RISK: NOT DETECTED
HPV, GENOTYPE 16: NOT DETECTED
HPV, GENOTYPE 18: NOT DETECTED

## 2023-09-07 ENCOUNTER — OFFICE VISIT (OUTPATIENT)
Dept: OBGYN | Age: 38
End: 2023-09-07

## 2023-09-07 VITALS
BODY MASS INDEX: 26.52 KG/M2 | DIASTOLIC BLOOD PRESSURE: 81 MMHG | WEIGHT: 175 LBS | HEIGHT: 68 IN | SYSTOLIC BLOOD PRESSURE: 132 MMHG

## 2023-09-07 DIAGNOSIS — R87.613 HGSIL (HIGH GRADE SQUAMOUS INTRAEPITHELIAL LESION) ON PAP SMEAR OF CERVIX: Primary | ICD-10-CM

## 2023-09-07 PROCEDURE — 99214 OFFICE O/P EST MOD 30 MIN: CPT | Performed by: OBSTETRICS & GYNECOLOGY

## 2023-09-07 RX ORDER — KETOROLAC TROMETHAMINE 10 MG/1
10 TABLET, FILM COATED ORAL EVERY 6 HOURS PRN
Qty: 20 TABLET | Refills: 0 | Status: SHIPPED | OUTPATIENT
Start: 2023-09-07 | End: 2024-09-06

## 2023-09-25 ENCOUNTER — HOSPITAL ENCOUNTER (OUTPATIENT)
Age: 38
Setting detail: SPECIMEN
Discharge: HOME | End: 2023-09-25

## 2023-09-25 ENCOUNTER — PROCEDURE VISIT (OUTPATIENT)
Dept: OBGYN | Age: 38
End: 2023-09-25

## 2023-09-25 VITALS
HEIGHT: 68 IN | WEIGHT: 179 LBS | DIASTOLIC BLOOD PRESSURE: 79 MMHG | BODY MASS INDEX: 27.13 KG/M2 | SYSTOLIC BLOOD PRESSURE: 115 MMHG

## 2023-09-25 DIAGNOSIS — Z01.818 PREOP TESTING: Primary | ICD-10-CM

## 2023-09-25 DIAGNOSIS — R87.610 ATYPICAL SQUAMOUS CELL OF UNDETERMINED SIGNIFICANCE OF CERVIX: ICD-10-CM

## 2023-09-25 DIAGNOSIS — R87.610 PAP SMEAR ABNORMALITY OF CERVIX WITH ASCUS FAVORING DYSPLASIA: ICD-10-CM

## 2023-09-25 DIAGNOSIS — R87.613 HGSIL (HIGH GRADE SQUAMOUS INTRAEPITHELIAL LESION) ON PAP SMEAR OF CERVIX: ICD-10-CM

## 2023-09-25 DIAGNOSIS — N87.1 DYSPLASIA OF CERVIX, HIGH GRADE CIN 2: ICD-10-CM

## 2023-09-25 LAB
CONTROL: NORMAL
PREGNANCY TEST URINE, POC: NEGATIVE

## 2023-09-25 PROCEDURE — 88307 TISSUE EXAM BY PATHOLOGIST: CPT

## 2023-09-25 PROCEDURE — 88342 IMHCHEM/IMCYTCHM 1ST ANTB: CPT

## 2023-09-25 PROCEDURE — 88341 IMHCHEM/IMCYTCHM EA ADD ANTB: CPT

## 2023-09-25 PROCEDURE — 81025 URINE PREGNANCY TEST: CPT | Performed by: OBSTETRICS & GYNECOLOGY

## 2023-09-25 PROCEDURE — 57522 CONIZATION OF CERVIX: CPT | Performed by: OBSTETRICS & GYNECOLOGY

## 2023-09-26 NOTE — PROGRESS NOTES
Procedure-LEEP:    Pelvic examination performed. Cervix was injected with 1% lidocaine with epinephrine. LEEP specimen obtained with 3 passes large loop. Cervix was fulgurated with Bovie cautery. Monsel's applied. Follow-up in 4 weeks.

## 2023-10-23 ENCOUNTER — OFFICE VISIT (OUTPATIENT)
Dept: OBGYN | Age: 38
End: 2023-10-23

## 2023-10-23 VITALS
WEIGHT: 178 LBS | DIASTOLIC BLOOD PRESSURE: 72 MMHG | SYSTOLIC BLOOD PRESSURE: 110 MMHG | BODY MASS INDEX: 26.98 KG/M2 | HEIGHT: 68 IN

## 2023-10-23 DIAGNOSIS — D06.9 HIGH GRADE SQUAMOUS INTRAEPITHELIAL LESION (HGSIL), GRADE 3 CIN, ON BIOPSY OF CERVIX: Primary | ICD-10-CM

## 2023-10-23 PROCEDURE — 99213 OFFICE O/P EST LOW 20 MIN: CPT | Performed by: OBSTETRICS & GYNECOLOGY

## 2023-11-27 ENCOUNTER — OFFICE VISIT (OUTPATIENT)
Dept: OBGYN | Age: 38
End: 2023-11-27

## 2023-11-27 VITALS
SYSTOLIC BLOOD PRESSURE: 107 MMHG | RESPIRATION RATE: 16 BRPM | BODY MASS INDEX: 27.43 KG/M2 | DIASTOLIC BLOOD PRESSURE: 75 MMHG | HEART RATE: 93 BPM | WEIGHT: 181 LBS | HEIGHT: 68 IN

## 2023-11-27 DIAGNOSIS — D06.9 HIGH GRADE SQUAMOUS INTRAEPITHELIAL LESION (HGSIL), GRADE 3 CIN, ON BIOPSY OF CERVIX: Primary | ICD-10-CM

## 2023-11-27 PROCEDURE — 99213 OFFICE O/P EST LOW 20 MIN: CPT | Performed by: OBSTETRICS & GYNECOLOGY

## 2024-04-25 ENCOUNTER — HOSPITAL ENCOUNTER (OUTPATIENT)
Age: 39
Setting detail: SPECIMEN
Discharge: HOME | End: 2024-04-25
Payer: COMMERCIAL

## 2024-04-25 ENCOUNTER — OFFICE VISIT (OUTPATIENT)
Dept: OBGYN | Age: 39
End: 2024-04-25
Payer: COMMERCIAL

## 2024-04-25 VITALS
BODY MASS INDEX: 28.95 KG/M2 | WEIGHT: 191 LBS | SYSTOLIC BLOOD PRESSURE: 123 MMHG | HEIGHT: 68 IN | DIASTOLIC BLOOD PRESSURE: 80 MMHG

## 2024-04-25 DIAGNOSIS — D06.9 HIGH GRADE SQUAMOUS INTRAEPITHELIAL LESION (HGSIL), GRADE 3 CIN, ON BIOPSY OF CERVIX: Primary | ICD-10-CM

## 2024-04-25 PROCEDURE — 99213 OFFICE O/P EST LOW 20 MIN: CPT | Performed by: OBSTETRICS & GYNECOLOGY

## 2024-04-25 PROCEDURE — 87624 HPV HI-RISK TYP POOLED RSLT: CPT

## 2024-04-25 PROCEDURE — 88142 CYTOPATH C/V THIN LAYER: CPT

## 2024-04-25 ASSESSMENT — PATIENT HEALTH QUESTIONNAIRE - PHQ9
SUM OF ALL RESPONSES TO PHQ QUESTIONS 1-9: 0
SUM OF ALL RESPONSES TO PHQ QUESTIONS 1-9: 0
1. LITTLE INTEREST OR PLEASURE IN DOING THINGS: NOT AT ALL
SUM OF ALL RESPONSES TO PHQ QUESTIONS 1-9: 0
SUM OF ALL RESPONSES TO PHQ9 QUESTIONS 1 & 2: 0
2. FEELING DOWN, DEPRESSED OR HOPELESS: NOT AT ALL
SUM OF ALL RESPONSES TO PHQ QUESTIONS 1-9: 0

## 2024-04-28 LAB — HPV HIGH RISK: DETECTED

## 2024-04-29 LAB
HPV GENOTYPE 18,45: NOT DETECTED
HPV SOURCE: NORMAL
HPV, GENOTYPE 16: NOT DETECTED